# Patient Record
Sex: FEMALE | Race: WHITE | NOT HISPANIC OR LATINO | Employment: OTHER | URBAN - METROPOLITAN AREA
[De-identification: names, ages, dates, MRNs, and addresses within clinical notes are randomized per-mention and may not be internally consistent; named-entity substitution may affect disease eponyms.]

---

## 2019-09-24 ENCOUNTER — DOCUMENTATION (OUTPATIENT)
Dept: ONCOLOGY | Facility: CLINIC | Age: 74
End: 2019-09-24

## 2019-09-24 ENCOUNTER — OFFICE VISIT (OUTPATIENT)
Dept: PULMONOLOGY | Facility: CLINIC | Age: 74
End: 2019-09-24

## 2019-09-24 VITALS
WEIGHT: 138.25 LBS | HEART RATE: 73 BPM | RESPIRATION RATE: 18 BRPM | OXYGEN SATURATION: 98 % | TEMPERATURE: 97.7 F | HEIGHT: 62 IN | BODY MASS INDEX: 25.44 KG/M2 | SYSTOLIC BLOOD PRESSURE: 126 MMHG | DIASTOLIC BLOOD PRESSURE: 80 MMHG

## 2019-09-24 DIAGNOSIS — Z72.0 TOBACCO ABUSE: ICD-10-CM

## 2019-09-24 DIAGNOSIS — J44.9 COPD MIXED TYPE (HCC): ICD-10-CM

## 2019-09-24 DIAGNOSIS — R91.1 LUNG NODULE: Primary | ICD-10-CM

## 2019-09-24 PROCEDURE — 99406 BEHAV CHNG SMOKING 3-10 MIN: CPT | Performed by: INTERNAL MEDICINE

## 2019-09-24 PROCEDURE — 94726 PLETHYSMOGRAPHY LUNG VOLUMES: CPT | Performed by: INTERNAL MEDICINE

## 2019-09-24 PROCEDURE — 94060 EVALUATION OF WHEEZING: CPT | Performed by: INTERNAL MEDICINE

## 2019-09-24 PROCEDURE — 94729 DIFFUSING CAPACITY: CPT | Performed by: INTERNAL MEDICINE

## 2019-09-24 PROCEDURE — 99204 OFFICE O/P NEW MOD 45 MIN: CPT | Performed by: INTERNAL MEDICINE

## 2019-09-24 RX ORDER — ALENDRONATE SODIUM 70 MG/1
TABLET ORAL
COMMUNITY

## 2019-09-24 RX ORDER — GUAIFENESIN 1200 MG/1
TABLET, EXTENDED RELEASE ORAL
COMMUNITY

## 2019-09-24 RX ORDER — PHENOL 1.4 %
600 AEROSOL, SPRAY (ML) MUCOUS MEMBRANE DAILY
COMMUNITY

## 2019-09-24 RX ORDER — CHOLECALCIFEROL (VITAMIN D3) 25 MCG
1000 CAPSULE ORAL DAILY
COMMUNITY

## 2019-09-24 RX ORDER — IPRATROPIUM BROMIDE AND ALBUTEROL SULFATE 2.5; .5 MG/3ML; MG/3ML
SOLUTION RESPIRATORY (INHALATION)
COMMUNITY
Start: 2018-06-13

## 2019-09-24 RX ORDER — TERBUTALINE SULFATE 5 MG/1
5 TABLET ORAL EVERY 6 HOURS
COMMUNITY
Start: 2019-06-29

## 2019-09-24 RX ORDER — ALBUTEROL SULFATE 90 UG/1
AEROSOL, METERED RESPIRATORY (INHALATION)
COMMUNITY
Start: 2018-06-29

## 2019-09-24 RX ORDER — ALBUTEROL SULFATE 90 UG/1
2 AEROSOL, METERED RESPIRATORY (INHALATION) ONCE
Status: COMPLETED | OUTPATIENT
Start: 2019-09-24 | End: 2019-09-24

## 2019-09-24 RX ADMIN — ALBUTEROL SULFATE 4 PUFF: 90 AEROSOL, METERED RESPIRATORY (INHALATION) at 10:33

## 2019-09-24 NOTE — PROGRESS NOTES
PULMONARY  NOTE    Chief Complaint     Abnormal CT scan of the chest, tobacco abuse, COPD    History of Present Illness     74-year-old white female referred for an abnormal CT scan of the chest.    She is a long-standing smoker.  She has smoked up to 1 pack of cigarettes per day.  She has smoked for over 30 years but is wanting to stop smoking and is trying to cut back.    In the past she underwent a CT scan of the chest due to recurrent respiratory tract infections.  This revealed a pulmonary nodule for which she has had serial scans the most recent of which revealed interval enlargement of a right lower lobe pleural-based density which is now greater than 2 cm in size.  No pathologically enlarged mediastinal adenopathy was noted.    She has chronic cough, worse in the morning.  She has recurrent respiratory tract infections and as been on 3-4 courses of antibiotics and steroids in the last year.    She has been on Advair and Pro Air and she thinks these inhalers help.  She previously had been on Spiriva but stopped it and does not think that it made any difference.    She has had no hemoptysis.    She is noted no weight loss.    She has no significant travel history.    Patient Active Problem List   Diagnosis   • Stage II (Moderate) COPD   • Tobacco abuse   • Enlarging RLL Lung nodule     Allergies   Allergen Reactions   • Penicillins Rash       Current Outpatient Medications:   •  ADVAIR DISKUS 250-50 MCG/DOSE DISKUS, Inhale 2 puffs 2 (Two) Times a Day., Disp: , Rfl:   •  albuterol sulfate HFA (PROAIR HFA) 108 (90 Base) MCG/ACT inhaler, ProAir HFA 90 mcg/actuation aerosol inhaler, Disp: , Rfl:   •  alendronate (FOSAMAX) 70 MG tablet, alendronate 70 mg tablet, Disp: , Rfl:   •  calcium carbonate (OS-JULIO) 600 MG tablet, Take 600 mg by mouth Daily., Disp: , Rfl:   •  Cholecalciferol (VITAMIN D-3) 1000 units capsule, Take 1,000 Units by mouth Daily., Disp: , Rfl:   •  guaiFENesin ER 1200 MG tablet  "sustained-release 12 hour, Mucus-ER MAX 1,200 mg tablet, extended release  take 1 tablet by mouth twice a day prn, Disp: , Rfl:   •  ipratropium-albuterol (DUO-NEB) 0.5-2.5 mg/3 ml nebulizer, ipratropium-albuterol 0.5 mg-3 mg(2.5 mg base)/3 mL nebulization soln, Disp: , Rfl:   •  terbutaline (BRETHINE) 5 MG tablet, Take 5 mg by mouth Every 6 (Six) Hours., Disp: , Rfl:   No current facility-administered medications for this visit.   MEDICATION LIST AND ALLERGIES REVIEWED.    Family History   Problem Relation Age of Onset   • Cancer Mother      Social History     Tobacco Use   • Smoking status: Current Every Day Smoker     Packs/day: 1.50     Years: 30.00     Pack years: 45.00     Types: Cigarettes   • Smokeless tobacco: Never Used   Substance Use Topics   • Alcohol use: No     Frequency: Never   • Drug use: No     Social History     Social History Narrative     for 20 years    Retired from working at the [employer] factory    Has 2 children    Has smoked up to 1 pack cigarettes per day for 30 years.    Denies alcohol use     FAMILY AND SOCIAL HISTORY REVIEWED.    Review of Systems  ALSO REFER TO SCANNED ROS SHEET FROM SAME DATE.    /80 (BP Location: Right arm, Patient Position: Sitting, Cuff Size: Adult)   Pulse 73   Temp 97.7 °F (36.5 °C)   Resp 18   Ht 157.5 cm (62\")   Wt 62.7 kg (138 lb 4 oz)   SpO2 98% Comment: room air at rest  BMI 25.29 kg/m²   Physical Exam   Constitutional: She is oriented to person, place, and time. She appears well-developed. No distress.   HENT:   Head: Normocephalic and atraumatic.   Neck: No thyromegaly present.   Cardiovascular: Normal rate, regular rhythm and normal heart sounds.   No murmur heard.  Pulmonary/Chest: Effort normal and breath sounds normal. No stridor.   Abdominal: Soft. Bowel sounds are normal.   Musculoskeletal: Normal range of motion. She exhibits no edema.   Lymphadenopathy:     She has no cervical adenopathy.        Right: No " supraclavicular and no epitrochlear adenopathy present.        Left: No supraclavicular and no epitrochlear adenopathy present.   Neurological: She is alert and oriented to person, place, and time.   Skin: Skin is warm and dry. She is not diaphoretic.   Psychiatric: She has a normal mood and affect. Her behavior is normal.   Nursing note and vitals reviewed.      Results     CT scans of the chest were reviewed on PACS.  Enlarging right lower lobe medial pleural-based density noted.    PFTs reveal mild to moderate airway obstruction with minimal improvement in FEV1 after bronchodilator.  Air-trapping is present but no restriction in a normal diffusion capacity    Problem List       ICD-10-CM ICD-9-CM   1. Enlarging RLL Lung Nodule R91.1 793.11   2. Tobacco abuse Z72.0 305.1   3. Stage II (Moderate) COPD J44.9 496       Discussion     We discussed her test results.  She has mild to moderate obstructive airways disease  This is presumably due to her history of tobacco abuse.  We will check an alpha-1 antitrypsin screen.    I recommended that she remain on the same bronchodilator regimen for her COPD.    We discussed the need for total smoking abstinence.  Three to 10 minutes was spent discussing the need for total smoking abstinence and smoking cessation strategies.    We reviewed her CT scan findings.  The enlarging right lower lobe lesion in this individual who is a smoker and has COPD is certainly worrisome for malignancy.  I would recommend a PET CT scan and if this lesion is hypermetabolic, consider surgical resection.    We will plan to see her back after the PET scan  Leandro Ovalle MD  Note electronically signed    CC: Delia Moody MD

## 2019-09-24 NOTE — PROGRESS NOTES
Met patient in lung nodule clinic with Dr. EDELMIRA Ovalle. She has smoked all her adult life and is actively working to quit. She denies hemoptysis or weight loss. Seriel CT chest has demonstrated enlarging right lower lobe nodule. Scans and PFT's reviewed. Per Dr. Ovalle PET now, if positive refer to CTS for surgical evaluation. Introduced lung navigator role and provided contact information. She v/u and agreeable to plan. She knows to call with questions or concerns.

## 2019-09-25 DIAGNOSIS — R91.1 NODULE OF LOWER LOBE OF RIGHT LUNG: Primary | ICD-10-CM

## 2019-10-09 ENCOUNTER — HOSPITAL ENCOUNTER (OUTPATIENT)
Dept: PET IMAGING | Facility: HOSPITAL | Age: 74
Discharge: HOME | End: 2019-10-09
Admitting: INTERNAL MEDICINE

## 2019-10-09 ENCOUNTER — HOSPITAL ENCOUNTER (OUTPATIENT)
Dept: PET IMAGING | Facility: HOSPITAL | Age: 74
Discharge: HOME | End: 2019-10-09

## 2019-10-09 DIAGNOSIS — R91.1 NODULE OF LOWER LOBE OF RIGHT LUNG: ICD-10-CM

## 2019-10-09 LAB — GLUCOSE BLDC GLUCOMTR-MCNC: 100 MG/DL (ref 70–130)

## 2019-10-09 PROCEDURE — 78815 PET IMAGE W/CT SKULL-THIGH: CPT

## 2019-10-09 PROCEDURE — 0 FLUDEOXYGLUCOSE F18 SOLUTION: Performed by: INTERNAL MEDICINE

## 2019-10-09 PROCEDURE — A9552 F18 FDG: HCPCS | Performed by: INTERNAL MEDICINE

## 2019-10-09 PROCEDURE — 82962 GLUCOSE BLOOD TEST: CPT

## 2019-10-09 RX ADMIN — FLUDEOXYGLUCOSE F18 1 DOSE: 300 INJECTION INTRAVENOUS at 10:36

## 2019-10-10 ENCOUNTER — TELEPHONE (OUTPATIENT)
Dept: PULMONOLOGY | Facility: CLINIC | Age: 74
End: 2019-10-10

## 2019-10-10 NOTE — TELEPHONE ENCOUNTER
Patient called would results of pet its says negative just want to verify this is ok please advise and thank you